# Patient Record
Sex: MALE | Race: WHITE | NOT HISPANIC OR LATINO | Employment: FULL TIME | URBAN - METROPOLITAN AREA
[De-identification: names, ages, dates, MRNs, and addresses within clinical notes are randomized per-mention and may not be internally consistent; named-entity substitution may affect disease eponyms.]

---

## 2024-01-02 ENCOUNTER — OFFICE VISIT (OUTPATIENT)
Dept: ORTHOPEDIC SURGERY | Facility: HOSPITAL | Age: 49
End: 2024-01-02
Payer: COMMERCIAL

## 2024-01-02 ENCOUNTER — HOSPITAL ENCOUNTER (OUTPATIENT)
Dept: RADIOLOGY | Facility: HOSPITAL | Age: 49
Discharge: HOME | End: 2024-01-02
Payer: COMMERCIAL

## 2024-01-02 VITALS — WEIGHT: 200 LBS | HEIGHT: 74 IN | BODY MASS INDEX: 25.67 KG/M2

## 2024-01-02 DIAGNOSIS — M25.561 ACUTE PAIN OF RIGHT KNEE: ICD-10-CM

## 2024-01-02 DIAGNOSIS — S83.511A NEW ACL TEAR, RIGHT, INITIAL ENCOUNTER: ICD-10-CM

## 2024-01-02 DIAGNOSIS — M23.8X1 MCL DEFICIENCY, KNEE, RIGHT: Primary | ICD-10-CM

## 2024-01-02 DIAGNOSIS — S83.241A ACUTE MEDIAL MENISCUS TEAR, RIGHT, INITIAL ENCOUNTER: ICD-10-CM

## 2024-01-02 PROCEDURE — 99213 OFFICE O/P EST LOW 20 MIN: CPT | Performed by: STUDENT IN AN ORGANIZED HEALTH CARE EDUCATION/TRAINING PROGRAM

## 2024-01-02 PROCEDURE — 73564 X-RAY EXAM KNEE 4 OR MORE: CPT | Mod: RIGHT SIDE | Performed by: RADIOLOGY

## 2024-01-02 PROCEDURE — 99203 OFFICE O/P NEW LOW 30 MIN: CPT | Performed by: STUDENT IN AN ORGANIZED HEALTH CARE EDUCATION/TRAINING PROGRAM

## 2024-01-02 PROCEDURE — E0114 CRUTCH UNDERARM PAIR NO WOOD: HCPCS | Performed by: STUDENT IN AN ORGANIZED HEALTH CARE EDUCATION/TRAINING PROGRAM

## 2024-01-02 PROCEDURE — 1036F TOBACCO NON-USER: CPT | Performed by: STUDENT IN AN ORGANIZED HEALTH CARE EDUCATION/TRAINING PROGRAM

## 2024-01-02 PROCEDURE — 73564 X-RAY EXAM KNEE 4 OR MORE: CPT | Mod: RT

## 2024-01-02 ASSESSMENT — PAIN SCALES - GENERAL: PAINLEVEL_OUTOF10: 9

## 2024-01-02 ASSESSMENT — PAIN - FUNCTIONAL ASSESSMENT: PAIN_FUNCTIONAL_ASSESSMENT: 0-10

## 2024-01-02 ASSESSMENT — PAIN DESCRIPTION - DESCRIPTORS: DESCRIPTORS: SHARP

## 2024-01-02 NOTE — PROGRESS NOTES
REFERRAL SOURCE: No ref. provider found     CHIEF COMPLAINT: left knee pain  - orthopedic injury clinic evaluation     HISTORY OF PRESENT ILLNESS  Alex Martines is a very pleasant 48 y.o. male who is here for evaluation of left knee pain.     1/2/24: On 12/30/2023, he was skiing.  He was going downhill and his right ski caught on a tree and his knee twisted into external rotation with a valgus stress.  Currently, he complains of medial knee pain.  He has significant swelling.  He has some feelings of instability.  No true locking.  No previous history of knee pain or injury.  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    MEDS  No current outpatient medications on file.    ALLERGIES  Not on File    PAST MEDICAL HISTORY  No past medical history on file.    PAST SURGICAL HISTORY  No past surgical history on file.    SOCIAL HISTORY   Social History     Socioeconomic History    Marital status:      Spouse name: Not on file    Number of children: Not on file    Years of education: Not on file    Highest education level: Not on file   Occupational History    Not on file   Tobacco Use    Smoking status: Not on file    Smokeless tobacco: Not on file   Substance and Sexual Activity    Alcohol use: Not on file    Drug use: Not on file    Sexual activity: Not on file   Other Topics Concern    Not on file   Social History Narrative    Not on file     Social Determinants of Health     Financial Resource Strain: Not on file   Food Insecurity: Not on file   Transportation Needs: Not on file   Physical Activity: Not on file   Stress: Not on file   Social Connections: Not on file   Intimate Partner Violence: Not on file   Housing Stability: Not on file       FAMILY HISTORY  No family history on file.    REVIEW OF SYSTEMS  Except for those mentioned in the history of present illness, and below, a complete review of systems is negative.     Review of Systems    VITALS  There were no vitals filed for  this visit.    PHYSICAL EXAMINATION   GENERAL:  Awake, alert, and oriented, no apparent distress, pleasant, and cooperative  PSYC: Mood is euthymic, affect is congruent  EAR, NOSE, THROAT:  Normocephalic, atraumatic, moist membranes, anicteric sclera  LUNG: Nonlabored breathing  HEART: No clubbing or cyanosis  SKIN: No increased erythema, warmth, rashes, or concerning skin lesions  NEURO: Sensation is intact in the bilateral lower extremities. Strength is grossly 5 out of 5 throughout the bilateral lower extremities, unless noted below.  GAIT: Antalgic  MUSCULOSKELETAL: Examination of the left knee: Range of motion is lacking 5 degrees of full extension. Flexes to 90. Large effusion.  Palpation of the medial joint line and MCL proximally.  No tenderness to palpation of the lateral joint lines, extensor mechanism or patellar facets.  Pain and laxity with valgus stress testing.  No laxity with varus stress testing.  Lachman's and anterior drawer are difficult to fully obtain due to patient guarding, but appear lax.  Negative posterior drawer. Esperanza's and meniscal grind tests are positive.    IMAGING STUDIES:   Radiographs of the right knee dated 1/2/2024 were personally reviewed and interpreted by me, Dr. Becky Barrientos, and the findings shared with the patient.  There is a suprapatellar effusion.  No evidence of fracture or significant degenerative change.     IMPRESSION  #1  Acute right knee injury sustained while skiing on 12/30/2023 with concern for MCL and medial meniscus tear as well as possible ACL tear    PLAN  The following was discussed with the patient:     Alex Martines is a very pleasant 48 y.o. male who is here for evaluation of left knee pain MCL and medial meniscus tear as well as possible ACL tear. We discussed the above. We will obtain an MRI of the right knee to evaluate for the above and guide possible surgical intervention. He was issued crutches today, as he's having instability and  difficulty with weight bearing. He will follow-up after the MRI.     The patient was counseled to remain active, but avoid activities that worsen symptoms. The patient and his wife were in agreement with this plan. All questions were answered to the best of my ability.    PATIENT EDUCATION:  Education was discussed at today's appointment. A learning needs assessment was performed.    Primary learner: Alex Martines  Barriers to learning: None  Preferred language: English  Learning preferences include: Seeing and doing.  Discussed: Diagnosis and treatment plan.  Demonstrated: Understanding of material discussed.  Patient education materials given: None.  Learner response: Learner demonstrated understanding.    This note was dictated using Dragon speech recognition software and was not corrected for spelling or grammatical errors.

## 2024-01-03 ENCOUNTER — ANCILLARY PROCEDURE (OUTPATIENT)
Dept: RADIOLOGY | Facility: CLINIC | Age: 49
End: 2024-01-03
Payer: COMMERCIAL

## 2024-01-03 DIAGNOSIS — S83.241A ACUTE MEDIAL MENISCUS TEAR, RIGHT, INITIAL ENCOUNTER: ICD-10-CM

## 2024-01-03 DIAGNOSIS — S83.511A NEW ACL TEAR, RIGHT, INITIAL ENCOUNTER: ICD-10-CM

## 2024-01-03 DIAGNOSIS — M25.561 ACUTE PAIN OF RIGHT KNEE: ICD-10-CM

## 2024-01-03 DIAGNOSIS — M23.8X1 MCL DEFICIENCY, KNEE, RIGHT: ICD-10-CM

## 2024-01-03 PROCEDURE — 73721 MRI JNT OF LWR EXTRE W/O DYE: CPT | Mod: RT

## 2024-01-03 PROCEDURE — 73721 MRI JNT OF LWR EXTRE W/O DYE: CPT | Mod: RIGHT SIDE | Performed by: RADIOLOGY

## 2024-01-05 ENCOUNTER — OFFICE VISIT (OUTPATIENT)
Dept: ORTHOPEDIC SURGERY | Facility: CLINIC | Age: 49
End: 2024-01-05
Payer: COMMERCIAL

## 2024-01-05 DIAGNOSIS — S83.412D TEAR OF MCL (MEDIAL COLLATERAL LIGAMENT) OF KNEE, LEFT, SUBSEQUENT ENCOUNTER: Primary | ICD-10-CM

## 2024-01-05 PROCEDURE — 99213 OFFICE O/P EST LOW 20 MIN: CPT | Performed by: STUDENT IN AN ORGANIZED HEALTH CARE EDUCATION/TRAINING PROGRAM

## 2024-01-05 PROCEDURE — 1036F TOBACCO NON-USER: CPT | Performed by: STUDENT IN AN ORGANIZED HEALTH CARE EDUCATION/TRAINING PROGRAM

## 2024-01-05 PROCEDURE — L1833 KO ADJ JNT POS R SUP PRE OTS: HCPCS | Performed by: STUDENT IN AN ORGANIZED HEALTH CARE EDUCATION/TRAINING PROGRAM

## 2024-01-05 NOTE — PROGRESS NOTES
REFERRAL SOURCE: No ref. provider found     CHIEF COMPLAINT: left knee pain    HISTORY OF PRESENT ILLNESS  Alex Martines is a very pleasant 48 y.o. male who is here for evaluation of left knee pain.     Previous history:  1/2/24: On 12/30/2023, he was skiing.  He was going downhill and his right ski caught on a tree and his knee twisted into external rotation with a valgus stress.  Currently, he complains of medial knee pain.  He has significant swelling.  He has some feelings of instability.  No true locking.  No previous history of knee pain or injury.  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    Interval history:  1/5/24: He has noted significant improvement in the swelling over the last several days.  He has had some improvement in his pain.  Continues to have instability with full weightbearing.    MEDS  No current outpatient medications on file.    ALLERGIES  No Known Allergies    PAST MEDICAL HISTORY  No past medical history on file.    PAST SURGICAL HISTORY  No past surgical history on file.    SOCIAL HISTORY   Social History     Socioeconomic History    Marital status:      Spouse name: Not on file    Number of children: Not on file    Years of education: Not on file    Highest education level: Not on file   Occupational History    Not on file   Tobacco Use    Smoking status: Never    Smokeless tobacco: Former     Types: Chew     Quit date: 2023   Vaping Use    Vaping Use: Never used   Substance and Sexual Activity    Alcohol use: Never    Drug use: Never    Sexual activity: Not on file   Other Topics Concern    Not on file   Social History Narrative    Not on file     Social Determinants of Health     Financial Resource Strain: Not on file   Food Insecurity: Not on file   Transportation Needs: Not on file   Physical Activity: Not on file   Stress: Not on file   Social Connections: Not on file   Intimate Partner Violence: Not on file   Housing Stability: Not on file        FAMILY HISTORY  No family history on file.    REVIEW OF SYSTEMS  Except for those mentioned in the history of present illness, and below, a complete review of systems is negative.     Review of Systems    VITALS  There were no vitals filed for this visit.    PHYSICAL EXAMINATION   GENERAL:  Awake, alert, and oriented, no apparent distress, pleasant, and cooperative  PSYC: Mood is euthymic, affect is congruent  EAR, NOSE, THROAT:  Normocephalic, atraumatic, moist membranes, anicteric sclera  LUNG: Nonlabored breathing  HEART: No clubbing or cyanosis  SKIN: No increased erythema, warmth, rashes, or concerning skin lesions  NEURO: Sensation is intact in the bilateral lower extremities. Strength is grossly 5 out of 5 throughout the bilateral lower extremities, unless noted below.  GAIT: Antalgic  MUSCULOSKELETAL: Examination of the left knee: Range of motion is full extension to 110 degrees of flexion.  Mild effusion.  Laxity with valgus stress.    IMAGING STUDIES:   Radiographs of the right knee dated 1/2/2024 - suprapatellar effusion.  No evidence of fracture or significant degenerative change.    MRI of the right knee dated 1/3/2024 was personally reviewed and interpreted by me, Dr. Becky Barrientos, and the findings shared with the patient.  There is a grade 2 MCL sprain and full-thickness tearing of the femoral origin of the medial patellofemoral ligament without bone bruise pattern consistent with patellar dislocation.  No evidence of meniscus, ACL, PCL, or LCL tearing.  Mild effusion.  Small popliteal fossa cyst with likely rupture.     IMPRESSION  #1  Acute right knee injury sustained while skiing on 12/30/2023 causing MCL and MPFL tear.    PLAN  The following was discussed with the patient:     Alex Martines is a very pleasant 48 y.o. male who is here for evaluation of left knee pain due to MCL tear.  He does have evidence of MPFL tear on MRI, but does not have a typical bone bruise pattern  associated with patellar dislocation.  Clinically, we will treat him like a grade 2 MCL tear.  He was given a hinged playmaker knee brace today which he should wear with ambulation.  We did discuss that if he has pain with ambulation in the brace that he can continue to utilize crutches over the next few days.  We also discussed that he can sleep in the brace if he is getting pain at night.  He was counseled on isometric quad and hamstring strengthening exercises and counseled to do range of motion from 0 to 90 degrees.  He was also given a referral to physical therapy to get set up in the next few weeks.  He will follow-up in 2 weeks.    The patient was counseled to avoid all cutting and strenuous activities.  He did ask about skiing this weekend, which I said he should avoid. The patient was in agreement with this plan. All questions were answered to the best of my ability.    PATIENT EDUCATION:  Education was discussed at today's appointment. A learning needs assessment was performed.    Primary learner: Alex Martines  Barriers to learning: None  Preferred language: English  Learning preferences include: Seeing and doing.  Discussed: Diagnosis and treatment plan.  Demonstrated: Understanding of material discussed.  Patient education materials given: None.  Learner response: Learner demonstrated understanding.    This note was dictated using Dragon speech recognition software and was not corrected for spelling or grammatical errors.

## 2024-01-05 NOTE — Clinical Note
Hi, I put this patient on your schedule for 2 week follow-up after a grade 2 MCL tear sustained while skiing. He's in a playmaker brace and I gave him a referral to PT. Really just need someone to check in on him and make sure he's not progressing too quickly; this just happened less than a week ago and he already asked me about skiing this weekend. He's an ortho trauma device rep.  Thanks for seeing him.  Lauro, Becky

## 2024-01-19 ENCOUNTER — OFFICE VISIT (OUTPATIENT)
Dept: ORTHOPEDIC SURGERY | Facility: HOSPITAL | Age: 49
End: 2024-01-19
Payer: COMMERCIAL

## 2024-01-19 VITALS — WEIGHT: 195 LBS | BODY MASS INDEX: 25.03 KG/M2 | HEIGHT: 74 IN

## 2024-01-19 DIAGNOSIS — M25.561 ACUTE PAIN OF RIGHT KNEE: ICD-10-CM

## 2024-01-19 DIAGNOSIS — S83.411D GRADE 2 SPRAIN OF MEDIAL COLLATERAL LIGAMENT OF KNEE, RIGHT, SUBSEQUENT ENCOUNTER: Primary | ICD-10-CM

## 2024-01-19 PROCEDURE — 99214 OFFICE O/P EST MOD 30 MIN: CPT | Performed by: FAMILY MEDICINE

## 2024-01-19 PROCEDURE — 1036F TOBACCO NON-USER: CPT | Performed by: FAMILY MEDICINE

## 2024-01-19 ASSESSMENT — PAIN SCALES - GENERAL: PAINLEVEL_OUTOF10: 5 - MODERATE PAIN

## 2024-01-19 ASSESSMENT — PAIN - FUNCTIONAL ASSESSMENT: PAIN_FUNCTIONAL_ASSESSMENT: 0-10

## 2024-01-19 ASSESSMENT — PAIN DESCRIPTION - DESCRIPTORS: DESCRIPTORS: DULL

## 2024-01-19 NOTE — PROGRESS NOTES
** Please excuse any errors in grammar or translation related to this dictation. Voice recognition software was utilized to prepare this document. **    Assessment & Plan:  Patient following up for right knee pain following MCL sprain and MPFL sprain sustained on December 30, 2023.  He previously saw Dr. Barrientos for this condition.  While MPFL sprain was demonstrated on MRI he has no apprehension with motion of his patella.  His primary issue at this time is the MCL sprain.  He has been wearing his hinged knee brace essentially all times except for hygiene which does help mitigate his pain and provide stability.  He does continue experience medial joint line pain when a valgus stress is applied.  Of note he did attempt to ski last weekend and after 1 run stop the activity due to his pain and feeling of instability.  He has not engaged with physical therapy as he feels he can do it at home.  I did recommend formal physical therapy but did provide him with home rehab exercises if he is not interested in pursuing that option.  Continue to wear the knee brace as needed for comfort and stability.  Discussed with patient it can take several weeks to return to sporting activities however something like skiing that does require application of valgus stress may take additional time.  Patient will follow-up in 3 to 4 weeks for reassessment.  All questions answered and patient agrees this plan.      Chief complaint:  Right knee pain    HPI:  48-year-old male presents with acute right knee pain.  He suffered an MCL sprain and MPFL sprain and a skiing accident that occurred on December 30.  He was previously evaluated by Dr. Davis for this condition, provided knee brace, and advised on physical therapy.  He has been wearing the knee brace regularly.  Is not doing formal physical therapy but rides a stationary bike for exercise.  Notes straight line walking is tolerable without pain.  However anything that places knee into a  valgus stress increases pain.  He did attempt skiing last weekend and noted that the valgus strain make his knee feel very unstable so he discontinued this activity.    Exam:  Right Knee Exam:  [Normal gait] wearing hinged knee brace  No effusion, ecchymosis, erythema  Tenderness overlying the medial joint line as well as the proximal MCL  Flexion to [120] [id number], Extension to [0] of 0deg   5/5 strength of knee flexion and extension  SILT   ACL: [-]Lachman, [-]Anterior drawer  PCL:  [-]Posterior drawer  MCL: No laxity at 0 degrees with valgus.  Valgus stress at 30 degrees demonstrates pain and laxity with firm endpoint.  LCL: No laxity with varus at 0 or 30 deg  MENISCAL SIGNS: [-]Esperanza´s  PATELLA: [-]Grind, [-]Compression, [-]Apprehension   FAT PAD:  [-]Hoffa´s     Results:  Reviewed previous encounters with Dr. Barrientos dated 1/2/2024 and 1/5/2024.  Reviewed x-ray of right knee dated 1/2/2024 which was negative for degenerative changes or acute fracture.  Reviewed MRI of right knee dated 1/3/2024 which demonstrates MCL sprain and MPFL sprain.  Normal-appearing cruciate ligaments and menisci.

## 2024-02-16 ENCOUNTER — APPOINTMENT (OUTPATIENT)
Dept: ORTHOPEDIC SURGERY | Facility: HOSPITAL | Age: 49
End: 2024-02-16
Payer: COMMERCIAL